# Patient Record
Sex: FEMALE | Race: WHITE | NOT HISPANIC OR LATINO | Employment: PART TIME | URBAN - METROPOLITAN AREA
[De-identification: names, ages, dates, MRNs, and addresses within clinical notes are randomized per-mention and may not be internally consistent; named-entity substitution may affect disease eponyms.]

---

## 2017-02-08 ENCOUNTER — OFFICE VISIT - HEALTHEAST (OUTPATIENT)
Dept: INTERNAL MEDICINE | Facility: CLINIC | Age: 42
End: 2017-02-08

## 2017-02-08 DIAGNOSIS — R05.9 COUGH: ICD-10-CM

## 2017-02-08 RX ORDER — ACETAMINOPHEN AND CODEINE PHOSPHATE 300; 30 MG/1; MG/1
1 TABLET ORAL EVERY 4 HOURS PRN
Status: SHIPPED | COMMUNITY
Start: 2017-02-08

## 2021-05-30 VITALS — WEIGHT: 129.8 LBS | BODY MASS INDEX: 25.56 KG/M2

## 2021-06-08 NOTE — PROGRESS NOTES
[facility] Visit  Patient Name: Jazmine Powell  Patient Age: 41 y.o.  YOB: 1975  MRN: [id number]  ?  Date of Visit: 2/8/2017  Reason for Office Visit:   Chief Complaint   Patient presents with     cough     HPI: Jazmine Powell 41 y.o. female who presents to clinic for she just flew in from [place] on 2/1. Cough started 4 days ago. Coughing/wheezing, flu like symptoms, achy, sore throat. Temp of 103 last night. Cough keeping her up at night. Dry, non productive.     Review of Systems: As noted in HPI     Current Scheduled Meds:  Outpatient Encounter Prescriptions as of 2/8/2017   Medication Sig Dispense Refill     acetaminophen-codeine (TYLENOL-CODEINE #3) 300-30 mg per tablet Take 1 tablet by mouth every 4 (four) hours as needed for pain.       albuterol (PROAIR HFA) 90 mcg/actuation inhaler Inhale 2 puffs as needed.       codeine-guaiFENesin (GUAIFENESIN AC)  mg/5 mL liquid Take 10 mL by mouth bedtime as needed for cough. 118 mL 0     dextroamphetamine-amphetamine (ADDERALL) 10 mg Tab tablet Take 1 tablet by mouth daily. (take with one 5mg tablet; dose is 15mg daily) 30 tablet 0     dextroamphetamine-amphetamine (ADDERALL) 10 mg Tab tablet Take 1 tablet by mouth daily. (take with one 5mg tablet; dose is 15mg daily) 30 tablet 0     dextroamphetamine-amphetamine (ADDERALL) 10 mg Tab tablet Take 1 tablet by mouth daily. (take with one 5mg tablet; dose is 15mg daily) 30 tablet 0     dextroamphetamine-amphetamine (ADDERALL) 5 mg Tab tablet Take 1 tablet by mouth daily. (take with one 10mg tablet; dose is 15mg daily) 30 tablet 0     dextroamphetamine-amphetamine (ADDERALL) 5 mg Tab tablet Take 1 tablet by mouth daily. (take with one 10mg tablet; dose is 15mg daily) 30 tablet 0     dextroamphetamine-amphetamine (ADDERALL) 5 mg Tab tablet Take 1 tablet by mouth daily. 30 tablet 0     No facility-administered encounter medications on file as of 2/8/2017.      No past medical history on  file.  No past surgical history on file.  Social History   Substance Use Topics     Smoking status: Former Smoker     Quit date: 8/24/2010     Smokeless tobacco: None     Alcohol use None       Objective / Physical Examination:  Visit Vitals     /62     Pulse 72     Temp 98.4  F (36.9  C)     Wt 129 lb 12.8 oz (58.9 kg)     Breastfeeding No     BMI 25.56 kg/m2     Wt Readings from Last 3 Encounters:   02/08/17 129 lb 12.8 oz (58.9 kg)   09/21/16 131 lb 1.6 oz (59.5 kg)   08/24/16 126 lb 9.6 oz (57.4 kg)     Body mass index is 25.56 kg/(m^2). (>25?)    General Appearance: Alert and oriented in no acute distress  Head: Normocephalic, atraumatic  Ears: Tympanic membrane clear with landmarks well visualized bilaterally  Eyes: conjunctivae clear  Nose: mucosa moist and without drainage  Throat:  pharynx without erythema or exudate  Neck: No cervical adenopathyBack:   Lungs: Clear to auscultation bilaterally. Normal inspiratory and expiratory effort. No w/r/r  Cardiovascular: RRR S1, S2  Integumentary: Warm and dry  Neuro: Alert and oriented, follows commands appropriately      Assessment / Plan / Medical Decision Making:      Encounter Diagnoses   Name Primary?     Cough Yes        1. Cough  Likely viral  Lungs CTA  Vitals stable  Will treat symptomatically with guaifenesin AC qhs  Fluids, rest, ibuprofen/tylenol prn     - codeine-guaiFENesin (GUAIFENESIN AC)  mg/5 mL liquid; Take 10 mL by mouth bedtime as needed for cough.  Dispense: 118 mL; Refill: 0    Return if not improving or worsening     Total time spent with patient was 10 minutes with >50% of time spent in face-to-face counseling regarding the above plan     Mando Russell MD  [facility]

## 2023-02-25 ENCOUNTER — OFFICE VISIT (OUTPATIENT)
Dept: URGENT CARE | Facility: URGENT CARE | Age: 48
End: 2023-02-25
Payer: COMMERCIAL

## 2023-02-25 ENCOUNTER — NURSE TRIAGE (OUTPATIENT)
Dept: NURSING | Facility: CLINIC | Age: 48
End: 2023-02-25
Payer: COMMERCIAL

## 2023-02-25 ENCOUNTER — ANCILLARY PROCEDURE (OUTPATIENT)
Dept: GENERAL RADIOLOGY | Facility: CLINIC | Age: 48
End: 2023-02-25
Attending: EMERGENCY MEDICINE
Payer: COMMERCIAL

## 2023-02-25 VITALS
DIASTOLIC BLOOD PRESSURE: 57 MMHG | BODY MASS INDEX: 27.37 KG/M2 | TEMPERATURE: 102.2 F | HEART RATE: 102 BPM | SYSTOLIC BLOOD PRESSURE: 122 MMHG | RESPIRATION RATE: 18 BRPM | OXYGEN SATURATION: 96 % | WEIGHT: 139 LBS

## 2023-02-25 DIAGNOSIS — R06.00 ACUTE DYSPNEA: ICD-10-CM

## 2023-02-25 DIAGNOSIS — J45.21 MILD INTERMITTENT ASTHMA WITH ACUTE EXACERBATION: ICD-10-CM

## 2023-02-25 DIAGNOSIS — Z20.822 SUSPECTED COVID-19 VIRUS INFECTION: Primary | ICD-10-CM

## 2023-02-25 LAB
DEPRECATED S PYO AG THROAT QL EIA: NEGATIVE
FLUAV AG SPEC QL IA: NEGATIVE
FLUBV AG SPEC QL IA: NEGATIVE
GROUP A STREP BY PCR: NOT DETECTED

## 2023-02-25 PROCEDURE — 87651 STREP A DNA AMP PROBE: CPT | Performed by: EMERGENCY MEDICINE

## 2023-02-25 PROCEDURE — 71046 X-RAY EXAM CHEST 2 VIEWS: CPT | Mod: TC | Performed by: RADIOLOGY

## 2023-02-25 PROCEDURE — 99204 OFFICE O/P NEW MOD 45 MIN: CPT | Mod: CS | Performed by: EMERGENCY MEDICINE

## 2023-02-25 PROCEDURE — 87804 INFLUENZA ASSAY W/OPTIC: CPT | Performed by: EMERGENCY MEDICINE

## 2023-02-25 RX ORDER — PREDNISONE 20 MG/1
40 TABLET ORAL DAILY
Qty: 10 TABLET | Refills: 0 | Status: SHIPPED | OUTPATIENT
Start: 2023-02-25 | End: 2023-03-02

## 2023-02-25 RX ORDER — ACETAMINOPHEN 500 MG
1000 TABLET ORAL ONCE
Status: COMPLETED | OUTPATIENT
Start: 2023-02-25 | End: 2023-02-25

## 2023-02-25 RX ADMIN — Medication 1000 MG: at 16:41

## 2023-02-25 NOTE — TELEPHONE ENCOUNTER
Pt calling, wants to know what she can do for possibly having Covid. History of Asthma. Testing negative via home test, Room mate tests positive for Covid 3 days ago    Symptoms started 2/23, with headache, Sore throat, muscle aches, low grade fever, can start to feel some resistant with breathing, can hear little wheezing. Coughing-up some clear phlegm  When going up the stairs, feeling little SOB, little bit of running nose, chest congestion,  Feels like walking pneumonia  Taking in a deep breath leads to coughing. Feeling clumsy the past couple of day. Chest feels heavy. Mild difficulty breathing.       Triage to be seen within 4 hours, Pt does not have a PCP. Advised UC/ED. Care advice given. Call back if symptoms worsen or have more questions/concerns. Pt verbalized understanding and will go to nearest .    Gideon Malin RN, BSN  2/25/2023 at 10:27 AM  [facility]      Reason for Disposition    MILD difficulty breathing (e.g., minimal/no SOB at rest, SOB with walking, pulse <100)    Additional Information    Negative: SEVERE difficulty breathing (e.g., struggling for each breath, speaks in single words)    Negative: Difficult to awaken or acting confused (e.g., disoriented, slurred speech)    Negative: Bluish (or gray) lips or face now    Negative: Shock suspected (e.g., cold/pale/clammy skin, too weak to stand, low BP, rapid pulse)    Negative: Sounds like a life-threatening emergency to the triager    Negative: SEVERE or constant chest pain or pressure  (Exception: Mild central chest pain, present only when coughing.)    Negative: MODERATE difficulty breathing (e.g., speaks in phrases, SOB even at rest, pulse 100-120)    Negative: [1] Headache AND [2] stiff neck (can't touch chin to chest)    Negative: Oxygen level (e.g., pulse oximetry) 90 percent or lower    Negative: Chest pain or pressure    Negative: Patient sounds very sick or weak to the triager    Protocols used: CORONAVIRUS (COVID-19)  DIAGNOSED OR [id number]

## 2023-02-26 ENCOUNTER — NURSE TRIAGE (OUTPATIENT)
Dept: NURSING | Facility: CLINIC | Age: 48
End: 2023-02-26
Payer: COMMERCIAL

## 2023-02-26 NOTE — TELEPHONE ENCOUNTER
Pt is phoning with a general question concerning COVID that writer was able to answer     No triage     Liz Vega RN  [place]  11:42 AM 2/26/2023      Reason for Disposition    General information question, no triage required and triager able to answer question    Additional Information    Negative: [1] Caller is not with the adult (patient) AND [2] reporting urgent symptoms    Negative: Lab result questions    Negative: Medication questions    Negative: Caller can't be reached by phone    Negative: Caller has already spoken to PCP or another triager    Negative: RN needs further essential information from caller in order to complete triage    Negative: Requesting regular office appointment    Negative: [1] Caller requesting NON-URGENT health information AND [2] PCP's office is the best resource    Negative: Health Information question, no triage required and triager able to answer question    Protocols used: INFORMATION ONLY CALL - NO TRIAGE-A-

## 2023-02-26 NOTE — TELEPHONE ENCOUNTER
Patient calling. Patient seen in  yesterday and had a Covid PCR test done but no results in chart.   Patient just had a positive Covid home test this morning. Patient is having a little bit of chest pain that is mostly when she coughs but there is a very mild dull pain. Some mild wheezing but not like previous asthma attacks. Patient has an albuterol inhaler but has not needed to use it. Patient given a prescription for prednisone but has not started to take it. Patient is hoping not to need it as she has previously had josephine when on prednisone.   Patient reports headache and stiff neck but is able to bring her chin to her chest. Symptoms started on 2/23 with a headache, sore throat, sinus congestion, cough, fever.   Patient reports asthma and previous intubation a few years back for her asthma and has a history of walking pneumonia. Oxygen 95% and 103 pulse.    Patient is not established with a PCP.   Routing message to  provider pool to see if they can write for Paxlovid as patient was seen yesterday and now has a positive Covid test.     Erika Minor RN   02/26/23 9:31 AM  [facility]        Reason for Disposition    [1] HIGH RISK for severe COVID complications (e.g., weak immune system, age > 64 years, obesity with BMI of 30 or higher, pregnant, chronic lung disease or other chronic medical condition) AND [2] COVID symptoms (e.g., cough, fever)  (Exceptions: Already seen by PCP and no new or worsening symptoms.)    Additional Information    Negative: SEVERE difficulty breathing (e.g., struggling for each breath, speaks in single words)    Negative: Difficult to awaken or acting confused (e.g., disoriented, slurred speech)    Negative: Bluish (or gray) lips or face now    Negative: Shock suspected (e.g., cold/pale/clammy skin, too weak to stand, low BP, rapid pulse)    Negative: Sounds like a life-threatening emergency to the triager    Negative: [1] Diagnosed or suspected COVID-19 AND [2]  symptoms lasting 3 or more weeks    Negative: [1] COVID-19 exposure AND [2] no symptoms    Negative: COVID-19 vaccine reaction suspected (e.g., fever, headache, muscle aches) occurring 1 to 3 days after getting vaccine    Negative: COVID-19 vaccine, questions about    Negative: [1] Lives with someone known to have influenza (flu test positive) AND [2] flu-like symptoms (e.g., cough, runny nose, sore throat, SOB; with or without fever)    Negative: [1] Adult with possible COVID-19 symptoms AND [2] triager concerned about severity of symptoms or other causes    Negative: COVID-19 and breastfeeding, questions about    Negative: SEVERE or constant chest pain or pressure  (Exception: Mild central chest pain, present only when coughing.)    Negative: MODERATE difficulty breathing (e.g., speaks in phrases, SOB even at rest, pulse 100-120)    Negative: Headache and stiff neck (can't touch chin to chest)    Negative: Oxygen level (e.g., pulse oximetry) 90 percent or lower    Negative: Chest pain or pressure  (Exception: MILD central chest pain, present only when coughing)    Negative: Patient sounds very sick or weak to the triager    Negative: MILD difficulty breathing (e.g., minimal/no SOB at rest, SOB with walking, pulse <100)    Negative: Fever > 103 F (39.4 C)    Negative: [1] Fever > 101 F (38.3 C) AND [2] over 60 years of age    Negative: [1] Fever > 100.0 F (37.8 C) AND [2] bedridden (e.g., nursing home patient, CVA, chronic illness, recovering from surgery)    Protocols used: CORONAVIRUS (COVID-19) DIAGNOSED OR [id number]

## 2023-02-27 ENCOUNTER — LAB (OUTPATIENT)
Dept: LAB | Facility: CLINIC | Age: 48
End: 2023-02-27
Payer: COMMERCIAL

## 2023-02-27 DIAGNOSIS — Z20.822 SUSPECTED COVID-19 VIRUS INFECTION: ICD-10-CM

## 2023-02-27 LAB
ANION GAP SERPL CALCULATED.3IONS-SCNC: 10 MMOL/L (ref 7–15)
BUN SERPL-MCNC: 4.4 MG/DL (ref 6–20)
CALCIUM SERPL-MCNC: 8.9 MG/DL (ref 8.6–10)
CHLORIDE SERPL-SCNC: 102 MMOL/L (ref 98–107)
CREAT SERPL-MCNC: 0.65 MG/DL (ref 0.51–0.95)
DEPRECATED HCO3 PLAS-SCNC: 27 MMOL/L (ref 22–29)
GFR SERPL CREATININE-BSD FRML MDRD: >90 ML/MIN/1.73M2
GLUCOSE SERPL-MCNC: 98 MG/DL (ref 70–99)
POTASSIUM SERPL-SCNC: 3.9 MMOL/L (ref 3.4–5.3)
SODIUM SERPL-SCNC: 139 MMOL/L (ref 136–145)

## 2023-02-27 PROCEDURE — 36415 COLL VENOUS BLD VENIPUNCTURE: CPT

## 2023-02-27 PROCEDURE — 80048 BASIC METABOLIC PNL TOTAL CA: CPT

## 2023-05-13 ENCOUNTER — HEALTH MAINTENANCE LETTER (OUTPATIENT)
Age: 48
End: 2023-05-13

## 2024-07-20 ENCOUNTER — HEALTH MAINTENANCE LETTER (OUTPATIENT)
Age: 49
End: 2024-07-20

## 2025-05-18 ENCOUNTER — HEALTH MAINTENANCE LETTER (OUTPATIENT)
Age: 50
End: 2025-05-18

## 2025-08-09 ENCOUNTER — HEALTH MAINTENANCE LETTER (OUTPATIENT)
Age: 50
End: 2025-08-09